# Patient Record
Sex: FEMALE | Race: WHITE
[De-identification: names, ages, dates, MRNs, and addresses within clinical notes are randomized per-mention and may not be internally consistent; named-entity substitution may affect disease eponyms.]

---

## 2018-10-14 ENCOUNTER — HOSPITAL ENCOUNTER (OUTPATIENT)
Dept: HOSPITAL 62 - RAD | Age: 33
End: 2018-10-14
Attending: NURSE PRACTITIONER
Payer: OTHER GOVERNMENT

## 2018-10-14 DIAGNOSIS — R91.1: Primary | ICD-10-CM

## 2018-10-14 PROCEDURE — A9552 F18 FDG: HCPCS

## 2018-10-14 PROCEDURE — 78815 PET IMAGE W/CT SKULL-THIGH: CPT

## 2018-10-19 NOTE — RADIOLOGY REPORT (SQ)
EXAM DESCRIPTION:  PET CT SKULL/THIGH



COMPLETED DATE/TIME:  10/14/2018 9:07 pm



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



COMPARISON:  Outside CT of the chest dated 7/31/2018.



RADIONUCLIDE AND DOSE:  10 mCi F18 FDG

The route of agent administration: Intravenous



FASTING BLOOD SUGAR:  83 mg/dl



CONTRAST TYPE AND DOSE:  No CT contrast given.



TECHNIQUE:  Blood glucose level was verified.  Above dose of FDG was injected intravenously.  2-D seg
mented attenuation correction images were obtained from the base of the skull to the midthighs.  Nonc
ontrast CT images were obtained for attenuation correction and fusion with emission images.  CT image
s were performed without oral or intravenous contrast and are not sensitive for parenchymal lesions. 
 A series of overlapping emission PET images were obtained.  Images reviewed and manipulated at [facility] work station by the radiologist.  Images stored on PACS.



LIMITATIONS:  None.



FINDINGS:  HEAD AND NECK: No areas of abnormal metabolic activity in the soft tissues of the head and
 neck.

CHEST: Small lymph node in the right supraclavicular region, measuring 9 mm.  Mean SUV slightly above
 baseline, measuring 1.79.  Multiple bilateral axillary lymph nodes, more prominent on the right.  Ayala
rgical clips in the left axilla.  Lymph nodes in the right axilla measure from 1.4 cm to 2 cm and in 
the left axilla the largest measures 1.3 cm.  Mean SUV values of the right axillary lymph nodes range
 from 2.35 to 3.04 and in the left axilla mean SUV 1.38.  Focal area of mild pleural thickening in th
e medial right lung base unchanged.  No unusual activity on PET images.

ABDOMEN AND PELVIS: No areas of abnormal metabolic activity in the abdomen or pelvis.  Expected physi
ologic activity is present in the genitourinary system and bowel.

PROXIMAL LOWER EXTREMITIES: No areas of abnormal metabolic activity in the soft tissues of the lower 
extremities.

BONES: No abnormal metabolic activity in the visualized skeleton.

ADDITIONAL CT FINDINGS: No additional significant findings on the noncontrast CT images.

OTHER: No other significant findings.  Blood pool mean SUV activity 1.21 and soft tissue mean SUV act
ivity 1.72.



IMPRESSION:  BILATERAL AXILLARY LYMPH NODES, RIGHT GREATER THAN LEFT, AND MILD RIGHT SUPRACLAVICULAR 
ADENOPATHY.  INCREASED SUV VALUES, GREATEST IN THE RIGHT AXILLA.  THESE FINDINGS ARE NONSPECIFIC AND 
COULD BE DUE TO MALIGNANT PROCESS SUCH AS LYMPHOMA OR METASTASES, INFECTION, OR INFLAMMATION.  CORREL
ATE WITH PREVIOUS LYMPH NODE BIOPSY.  IF DESIRED, BIOPSY COULD BE PERFORMED OF ENLARGED RIGHT AXILLAR
Y LYMPH NODES WITH ULTRASOUND GUIDANCE.  THE FOCAL AREA OF MILD PLEURAL THICKENING IN THE MEDIAL RIGH
T LUNG BASE IS UNCHANGED AND DEMONSTRATES NO UNUSUAL METABOLIC ACTIVITY.  REMAINDER OF THE STUDY IS O
THERWISE UNREMARKABLE.



TECHNICAL DOCUMENTATION:  JOB ID:  [id number]

 2011 [employer]- All Rights Reserved



Reading location - IP/workstation name: [facility]-[email or web address]